# Patient Record
Sex: FEMALE | Race: WHITE | NOT HISPANIC OR LATINO | URBAN - METROPOLITAN AREA
[De-identification: names, ages, dates, MRNs, and addresses within clinical notes are randomized per-mention and may not be internally consistent; named-entity substitution may affect disease eponyms.]

---

## 2020-01-16 ENCOUNTER — EMERGENCY (EMERGENCY)
Facility: HOSPITAL | Age: 85
LOS: 1 days | Discharge: HOME | End: 2020-01-16
Attending: STUDENT IN AN ORGANIZED HEALTH CARE EDUCATION/TRAINING PROGRAM
Payer: MEDICARE

## 2020-01-16 VITALS
HEART RATE: 79 BPM | RESPIRATION RATE: 18 BRPM | DIASTOLIC BLOOD PRESSURE: 60 MMHG | SYSTOLIC BLOOD PRESSURE: 131 MMHG | TEMPERATURE: 98 F | OXYGEN SATURATION: 97 %

## 2020-01-16 VITALS
TEMPERATURE: 97 F | OXYGEN SATURATION: 100 % | WEIGHT: 130.07 LBS | HEIGHT: 65 IN | SYSTOLIC BLOOD PRESSURE: 150 MMHG | DIASTOLIC BLOOD PRESSURE: 75 MMHG | RESPIRATION RATE: 18 BRPM | HEART RATE: 82 BPM

## 2020-01-16 PROCEDURE — 72125 CT NECK SPINE W/O DYE: CPT

## 2020-01-16 PROCEDURE — 70450 CT HEAD/BRAIN W/O DYE: CPT

## 2020-01-16 PROCEDURE — 76377 3D RENDER W/INTRP POSTPROCES: CPT

## 2020-01-16 PROCEDURE — 90715 TDAP VACCINE 7 YRS/> IM: CPT

## 2020-01-16 PROCEDURE — 70486 CT MAXILLOFACIAL W/O DYE: CPT | Mod: 26

## 2020-01-16 PROCEDURE — 70450 CT HEAD/BRAIN W/O DYE: CPT | Mod: 26

## 2020-01-16 PROCEDURE — 12051 INTMD RPR FACE/MM 2.5 CM/<: CPT

## 2020-01-16 PROCEDURE — 72125 CT NECK SPINE W/O DYE: CPT | Mod: 26

## 2020-01-16 PROCEDURE — 99284 EMERGENCY DEPT VISIT MOD MDM: CPT | Mod: 25,GC

## 2020-01-16 PROCEDURE — 99284 EMERGENCY DEPT VISIT MOD MDM: CPT | Mod: 25

## 2020-01-16 PROCEDURE — 76377 3D RENDER W/INTRP POSTPROCES: CPT | Mod: 26

## 2020-01-16 PROCEDURE — 90471 IMMUNIZATION ADMIN: CPT

## 2020-01-16 PROCEDURE — 70486 CT MAXILLOFACIAL W/O DYE: CPT

## 2020-01-16 RX ORDER — TETANUS TOXOID, REDUCED DIPHTHERIA TOXOID AND ACELLULAR PERTUSSIS VACCINE, ADSORBED 5; 2.5; 8; 8; 2.5 [IU]/.5ML; [IU]/.5ML; UG/.5ML; UG/.5ML; UG/.5ML
0.5 SUSPENSION INTRAMUSCULAR ONCE
Refills: 0 | Status: COMPLETED | OUTPATIENT
Start: 2020-01-16 | End: 2020-01-16

## 2020-01-16 RX ADMIN — TETANUS TOXOID, REDUCED DIPHTHERIA TOXOID AND ACELLULAR PERTUSSIS VACCINE, ADSORBED 0.5 MILLILITER(S): 5; 2.5; 8; 8; 2.5 SUSPENSION INTRAMUSCULAR at 20:26

## 2020-01-16 NOTE — ED PROCEDURE NOTE - PROCEDURE ADDITIONAL DETAILS
2 sutures on the lateral L aspect of the nose w/ 6-0 prolene  and 1 suture on the L upper lip w/ 6-0 prolene

## 2020-01-16 NOTE — ED PROVIDER NOTE - PHYSICAL EXAMINATION
Gen: well developed and well nourished, NAD  Head: necrotic scab noted at top of the head s/p Mohs procedure pt and her phsyician  is aware  Face: punctuate laceration of nose, no nasal  hematoma, abrasion of forehead  Eyes: PERRL, EOMI  ENT: airway patent, mmm, oral cavity and pharynx normal. No inflammation, swelling, exudate, or lesions. uvula midline  CV: RRR, +S1/S2, no M/R/G  Resp: CTAB, symmetric breath sounds, no W/R/R  GI: abdomen soft non-distended, NTTP  Back: no spinal ttp c/t/l, no rib tenderness   Extremities - symmetric pulses, no edema, 5/5 strength, sensation intact, small abrasion of the LT hand  Neuro: A&Ox3, following commands, speech clear, moving all four extremities spontaneously  Psych: appropriate mood, normal insight

## 2020-01-16 NOTE — ED PROVIDER NOTE - PROGRESS NOTE DETAILS
Resident Merlin: abrasions and laceration is thoroughly cleaned with normal saline, clean dressing applied. How Severe Are Your Spot(S)?: mild What Is The Reason For Today's Visit?: Full Body Skin Examination What Is The Reason For Today's Visit? (Being Monitored For X): concerning skin lesions on an annual basis

## 2020-01-16 NOTE — ED PROVIDER NOTE - PATIENT PORTAL LINK FT
You can access the FollowMyHealth Patient Portal offered by [facility] by registering at the following website: [email or web address]. By joining [facility]’s FollowMyHealth portal, you will also be able to view your health information using other applications (apps) compatible with our system.

## 2020-01-16 NOTE — ED ADULT NURSE NOTE - OBJECTIVE STATEMENT
89 yo female brought in by ambulance for mechanical fall outside of her apartment building. 88 yo female brought in by ambulance for mechanical fall outside of her apartment building. States she tripped, lost her footing and fell forward and hit her head. Denies LOC, no blood thinners, denies CP, dizziness, SOB prior to or following fall. A man who lives in her building witnessed it and called 911, helped her up. On exam pt. has abrasion to the nasal bridge and forehead, denies pain elsewhere on her body. No other signs of injury or trauma. UK last tetanus but not sure if she would like to update it, will revisit later. Abd soft, non tender, neuro exam in tact. Grandson at the bedside, plan of care explained, bed locked and in lowest position, call bell provided.

## 2020-01-16 NOTE — ED PROVIDER NOTE - ATTENDING CONTRIBUTION TO CARE
89 F w/ PMH breast cancer and scc, HTN, on aspirin p/w trip and fall from standing hitting face falling forward at 430 pm. Pt denies any cp, no sob, no nausea no vomiting, no fevers, no chills.     I have reviewed the triage vital signs. Const: Well-nourished, Well-developed, Eyes: no conjunctival injection and no scleral icterus ENMT: abrasion on the nose and scalp,  approx 0.5 cm in size, Moist mucus membranes, SPINE: no c/t/l spine tenderness CVS: +S1/S2, radial/DP pulse 2+ bilaterally RESP: Unlabored respiratory effort, Clear to auscultation bilaterally GI: Nontender/Nondistended soft abdomen, no CVA tenderness MSK: Extremities w/o deformity or ttp, Psych: Awake, Alert, & Orientedx3;  Appropriate mood and affect, cooperative    Concern for intracranial hemorrhage, nasal bone fx, will obtain head ct/max face and c spine  will repair bleeding wounds

## 2020-01-16 NOTE — ED ADULT TRIAGE NOTE - CHIEF COMPLAINT QUOTE
fall from standing.  Head and facial trauma.  Patient is A/oX4, no bony deformity palpated upon arrival.  Pt on unknown anticoagulant

## 2020-01-16 NOTE — ED ADULT NURSE NOTE - INTERVENTIONS DEFINITIONS
Call bell, personal items and telephone within reach/Instruct patient to call for assistance/Non-slip footwear when patient is off stretcher/Monitor gait and stability/Stretcher in lowest position, wheels locked, appropriate side rails in place/Provide visual cue, wrist band, yellow gown, etc.

## 2020-01-16 NOTE — ED PROVIDER NOTE - NSFOLLOWUPINSTRUCTIONS_ED_ALL_ED_FT
1. TAKE ALL MEDICATIONS AS DIRECTED.    2. FOR PAIN OR FEVER YOU CAN TAKE IBUPROFEN (MOTRIN, ADVIL) OR ACETAMINOPHEN (TYLENOL) AS NEEDED, AS DIRECTED ON PACKAGING.  3. FOLLOW UP WITH YOUR PRIMARY DOCTOR WITHIN 5 DAYS AS DIRECTED.  4. IF YOU HAD LABS OR IMAGING DONE, YOU WERE GIVEN COPIES OF ALL LABS AND/OR IMAGING RESULTS FROM YOUR ER VISIT--PLEASE TAKE THEM WITH YOU TO YOUR FOLLOW UP APPOINTMENTS.  5. IF NEEDED, CALL PATIENT ACCESS SERVICES AT [phone] TO FIND A PRIMARY CARE PHYSICIAN.  OR CALL [phone] TO MAKE AN APPOINTMENT WITH THE CLINIC.  6. RETURN TO THE ER FOR ANY WORSENING SYMPTOMS OR CONCERNS. 1. TAKE ALL MEDICATIONS AS DIRECTED.    2. FOR PAIN OR FEVER YOU CAN TAKE IBUPROFEN (MOTRIN, ADVIL) OR ACETAMINOPHEN (TYLENOL) AS NEEDED, AS DIRECTED ON PACKAGING.  3. FOLLOW UP WITH YOUR PRIMARY DOCTOR WITHIN 5 DAYS AS DIRECTED.  4. IF YOU HAD LABS OR IMAGING DONE, YOU WERE GIVEN COPIES OF ALL LABS AND/OR IMAGING RESULTS FROM YOUR ER VISIT--PLEASE TAKE THEM WITH YOU TO YOUR FOLLOW UP APPOINTMENTS.  5. IF NEEDED, CALL PATIENT ACCESS SERVICES AT [phone] TO FIND A PRIMARY CARE PHYSICIAN.  OR CALL [phone] TO MAKE AN APPOINTMENT WITH THE CLINIC.  6. RETURN TO THE ER FOR ANY WORSENING SYMPTOMS OR CONCERNS.  7. please return in 7 days for suture removal.    A laceration is a cut that goes through all of the layers of the skin and into the tissue that is right under the skin. Some lacerations heal on their own. Others need to be closed with skin adhesive strips, skin glue, stitches (sutures), or staples. Proper laceration care minimizes the risk of infection and helps the laceration to heal better.  If non-absorbable stitches or staples have been placed, they must be taken out within the time frame instructed by your healthcare provider.    SEEK IMMEDIATE MEDICAL CARE IF YOU HAVE ANY OF THE FOLLOWING SYMPTOMS: swelling around the wound, worsening pain, drainage from the wound, red streaking going away from your wound, inability to move finger or toe near the laceration, or discoloration of skin near the laceration.

## 2020-01-16 NOTE — ED PROVIDER NOTE - CLINICAL SUMMARY MEDICAL DECISION MAKING FREE TEXT BOX
89 yo F with pmhx of  breast cancer and scc, HTN, on aspirin presents with facial laceration s/p trip and fall onto her face and hands1 hr ago. Imaging to eval for hematoma, facial fx. Clean the wounds, no reparation needed. TDAP. Will reassess. 89 yo F with pmhx of  breast cancer and scc, HTN, on aspirin presents with facial laceration s/p trip and fall onto her face and hands1 hr ago. Imaging to eval for hematoma, facial fx. Clean the wounds, no reparation needed. TDAP. Declined pain mgmt. Will reassess.

## 2020-01-16 NOTE — ED PROVIDER NOTE - OBJECTIVE STATEMENT
88 yo F with pmhx of  breast cancer and scc, HTN, on aspirin presents with facial laceration s/p trip and fall onto her face and hands1 hr ago. Tetanus status unknown. Denies fever, chills, nausea, vomiting, diarrhea, chest pain, shortness of breath, palpitations, cough, leg swelling, dysuria. Denies LOC. No pain anywhere.

## 2020-02-08 ENCOUNTER — INPATIENT (INPATIENT)
Facility: HOSPITAL | Age: 85
LOS: 4 days | Discharge: HOME | DRG: 194 | End: 2020-02-13
Attending: STUDENT IN AN ORGANIZED HEALTH CARE EDUCATION/TRAINING PROGRAM | Admitting: STUDENT IN AN ORGANIZED HEALTH CARE EDUCATION/TRAINING PROGRAM
Payer: MEDICARE

## 2020-02-08 VITALS
SYSTOLIC BLOOD PRESSURE: 154 MMHG | RESPIRATION RATE: 18 BRPM | WEIGHT: 119.93 LBS | TEMPERATURE: 98 F | HEIGHT: 60 IN | HEART RATE: 67 BPM | OXYGEN SATURATION: 98 % | DIASTOLIC BLOOD PRESSURE: 98 MMHG

## 2020-02-08 DIAGNOSIS — E78.5 HYPERLIPIDEMIA, UNSPECIFIED: ICD-10-CM

## 2020-02-08 DIAGNOSIS — J10.1 INFLUENZA DUE TO OTHER IDENTIFIED INFLUENZA VIRUS WITH OTHER RESPIRATORY MANIFESTATIONS: ICD-10-CM

## 2020-02-08 DIAGNOSIS — R19.7 DIARRHEA, UNSPECIFIED: ICD-10-CM

## 2020-02-08 DIAGNOSIS — R53.81 OTHER MALAISE: ICD-10-CM

## 2020-02-08 DIAGNOSIS — I10 ESSENTIAL (PRIMARY) HYPERTENSION: ICD-10-CM

## 2020-02-08 DIAGNOSIS — N17.9 ACUTE KIDNEY FAILURE, UNSPECIFIED: ICD-10-CM

## 2020-02-08 DIAGNOSIS — R53.1 WEAKNESS: ICD-10-CM

## 2020-02-08 DIAGNOSIS — Z29.9 ENCOUNTER FOR PROPHYLACTIC MEASURES, UNSPECIFIED: ICD-10-CM

## 2020-02-08 LAB
ALBUMIN SERPL ELPH-MCNC: 3.3 G/DL — SIGNIFICANT CHANGE UP (ref 3.3–5)
ALP SERPL-CCNC: 90 U/L — SIGNIFICANT CHANGE UP (ref 40–120)
ALT FLD-CCNC: 30 U/L — SIGNIFICANT CHANGE UP (ref 10–45)
ANION GAP SERPL CALC-SCNC: 15 MMOL/L — SIGNIFICANT CHANGE UP (ref 5–17)
APPEARANCE UR: CLEAR — SIGNIFICANT CHANGE UP
AST SERPL-CCNC: 36 U/L — SIGNIFICANT CHANGE UP (ref 10–40)
BACTERIA # UR AUTO: NEGATIVE — SIGNIFICANT CHANGE UP
BASE EXCESS BLDV CALC-SCNC: 4.4 MMOL/L — HIGH (ref -2–2)
BILIRUB SERPL-MCNC: 0.4 MG/DL — SIGNIFICANT CHANGE UP (ref 0.2–1.2)
BILIRUB UR-MCNC: NEGATIVE — SIGNIFICANT CHANGE UP
BUN SERPL-MCNC: 46 MG/DL — HIGH (ref 7–23)
CA-I SERPL-SCNC: 1.16 MMOL/L — SIGNIFICANT CHANGE UP (ref 1.12–1.3)
CALCIUM SERPL-MCNC: 9.9 MG/DL — SIGNIFICANT CHANGE UP (ref 8.4–10.5)
CHLORIDE BLDV-SCNC: 106 MMOL/L — SIGNIFICANT CHANGE UP (ref 96–108)
CHLORIDE SERPL-SCNC: 99 MMOL/L — SIGNIFICANT CHANGE UP (ref 96–108)
CO2 BLDV-SCNC: 29 MMOL/L — SIGNIFICANT CHANGE UP (ref 22–30)
CO2 SERPL-SCNC: 25 MMOL/L — SIGNIFICANT CHANGE UP (ref 22–31)
COLOR SPEC: SIGNIFICANT CHANGE UP
CREAT SERPL-MCNC: 1.26 MG/DL — SIGNIFICANT CHANGE UP (ref 0.5–1.3)
DIFF PNL FLD: NEGATIVE — SIGNIFICANT CHANGE UP
EPI CELLS # UR: 2 /HPF — SIGNIFICANT CHANGE UP
FLUAV H1 2009 PAND RNA SPEC QL NAA+PROBE: DETECTED
GAS PNL BLDV: 134 MMOL/L — LOW (ref 135–145)
GAS PNL BLDV: SIGNIFICANT CHANGE UP
GLUCOSE BLDV-MCNC: 97 MG/DL — SIGNIFICANT CHANGE UP (ref 70–99)
GLUCOSE SERPL-MCNC: 83 MG/DL — SIGNIFICANT CHANGE UP (ref 70–99)
GLUCOSE UR QL: NEGATIVE — SIGNIFICANT CHANGE UP
HCO3 BLDV-SCNC: 28 MMOL/L — SIGNIFICANT CHANGE UP (ref 21–29)
HCT VFR BLD CALC: 38.1 % — SIGNIFICANT CHANGE UP (ref 34.5–45)
HCT VFR BLDA CALC: 39 % — SIGNIFICANT CHANGE UP (ref 39–50)
HGB BLD CALC-MCNC: 12.6 G/DL — SIGNIFICANT CHANGE UP (ref 11.5–15.5)
HGB BLD-MCNC: 11.9 G/DL — SIGNIFICANT CHANGE UP (ref 11.5–15.5)
HYALINE CASTS # UR AUTO: 2 /LPF — SIGNIFICANT CHANGE UP (ref 0–2)
KETONES UR-MCNC: NEGATIVE — SIGNIFICANT CHANGE UP
LACTATE BLDV-MCNC: 1.6 MMOL/L — SIGNIFICANT CHANGE UP (ref 0.7–2)
LEUKOCYTE ESTERASE UR-ACNC: NEGATIVE — SIGNIFICANT CHANGE UP
MCHC RBC-ENTMCNC: 29.5 PG — SIGNIFICANT CHANGE UP (ref 27–34)
MCHC RBC-ENTMCNC: 31.2 GM/DL — LOW (ref 32–36)
MCV RBC AUTO: 94.5 FL — SIGNIFICANT CHANGE UP (ref 80–100)
NITRITE UR-MCNC: NEGATIVE — SIGNIFICANT CHANGE UP
NRBC # BLD: 0 /100 WBCS — SIGNIFICANT CHANGE UP (ref 0–0)
PCO2 BLDV: 37 MMHG — SIGNIFICANT CHANGE UP (ref 35–50)
PH BLDV: 7.48 — HIGH (ref 7.35–7.45)
PH UR: 5.5 — SIGNIFICANT CHANGE UP (ref 5–8)
PLATELET # BLD AUTO: 207 K/UL — SIGNIFICANT CHANGE UP (ref 150–400)
PO2 BLDV: 50 MMHG — HIGH (ref 25–45)
POTASSIUM BLDV-SCNC: 4 MMOL/L — SIGNIFICANT CHANGE UP (ref 3.5–5.3)
POTASSIUM SERPL-MCNC: 4.5 MMOL/L — SIGNIFICANT CHANGE UP (ref 3.5–5.3)
POTASSIUM SERPL-SCNC: 4.5 MMOL/L — SIGNIFICANT CHANGE UP (ref 3.5–5.3)
PROT SERPL-MCNC: 6.5 G/DL — SIGNIFICANT CHANGE UP (ref 6–8.3)
PROT UR-MCNC: ABNORMAL
RAPID RVP RESULT: DETECTED
RBC # BLD: 4.03 M/UL — SIGNIFICANT CHANGE UP (ref 3.8–5.2)
RBC # FLD: 13.7 % — SIGNIFICANT CHANGE UP (ref 10.3–14.5)
RBC CASTS # UR COMP ASSIST: 5 /HPF — HIGH (ref 0–4)
SAO2 % BLDV: 86 % — SIGNIFICANT CHANGE UP (ref 67–88)
SODIUM SERPL-SCNC: 139 MMOL/L — SIGNIFICANT CHANGE UP (ref 135–145)
SP GR SPEC: 1.02 — SIGNIFICANT CHANGE UP (ref 1.01–1.02)
UROBILINOGEN FLD QL: NEGATIVE — SIGNIFICANT CHANGE UP
WBC # BLD: 7.17 K/UL — SIGNIFICANT CHANGE UP (ref 3.8–10.5)
WBC # FLD AUTO: 7.17 K/UL — SIGNIFICANT CHANGE UP (ref 3.8–10.5)
WBC UR QL: 3 /HPF — SIGNIFICANT CHANGE UP (ref 0–5)

## 2020-02-08 PROCEDURE — 99223 1ST HOSP IP/OBS HIGH 75: CPT

## 2020-02-08 PROCEDURE — 71045 X-RAY EXAM CHEST 1 VIEW: CPT | Mod: 26

## 2020-02-08 PROCEDURE — 99285 EMERGENCY DEPT VISIT HI MDM: CPT | Mod: GC

## 2020-02-08 PROCEDURE — 93010 ELECTROCARDIOGRAM REPORT: CPT | Mod: GC

## 2020-02-08 RX ORDER — SODIUM CHLORIDE 9 MG/ML
1000 INJECTION INTRAMUSCULAR; INTRAVENOUS; SUBCUTANEOUS ONCE
Refills: 0 | Status: COMPLETED | OUTPATIENT
Start: 2020-02-08 | End: 2020-02-08

## 2020-02-08 RX ORDER — LATANOPROST 0.05 MG/ML
1 SOLUTION/ DROPS OPHTHALMIC; TOPICAL AT BEDTIME
Refills: 0 | Status: DISCONTINUED | OUTPATIENT
Start: 2020-02-08 | End: 2020-02-13

## 2020-02-08 RX ORDER — BIMATOPROST 0.3 MG/ML
1 SOLUTION/ DROPS OPHTHALMIC
Qty: 0 | Refills: 0 | DISCHARGE

## 2020-02-08 RX ORDER — ASPIRIN/CALCIUM CARB/MAGNESIUM 324 MG
81 TABLET ORAL DAILY
Refills: 0 | Status: DISCONTINUED | OUTPATIENT
Start: 2020-02-08 | End: 2020-02-13

## 2020-02-08 RX ORDER — TIMOLOL 0.5 %
1 DROPS OPHTHALMIC (EYE)
Refills: 0 | Status: DISCONTINUED | OUTPATIENT
Start: 2020-02-08 | End: 2020-02-13

## 2020-02-08 RX ORDER — IPRATROPIUM/ALBUTEROL SULFATE 18-103MCG
3 AEROSOL WITH ADAPTER (GRAM) INHALATION ONCE
Refills: 0 | Status: COMPLETED | OUTPATIENT
Start: 2020-02-08 | End: 2020-02-08

## 2020-02-08 RX ORDER — TIMOLOL 0.5 %
1 DROPS OPHTHALMIC (EYE)
Qty: 0 | Refills: 0 | DISCHARGE

## 2020-02-08 RX ORDER — ROSUVASTATIN CALCIUM 5 MG/1
1 TABLET ORAL
Qty: 0 | Refills: 0 | DISCHARGE

## 2020-02-08 RX ORDER — ASCORBIC ACID 60 MG
1 TABLET,CHEWABLE ORAL
Qty: 0 | Refills: 0 | DISCHARGE

## 2020-02-08 RX ORDER — ASPIRIN/CALCIUM CARB/MAGNESIUM 324 MG
1 TABLET ORAL
Qty: 0 | Refills: 0 | DISCHARGE

## 2020-02-08 RX ORDER — ATORVASTATIN CALCIUM 80 MG/1
40 TABLET, FILM COATED ORAL AT BEDTIME
Refills: 0 | Status: DISCONTINUED | OUTPATIENT
Start: 2020-02-08 | End: 2020-02-13

## 2020-02-08 RX ORDER — CHOLECALCIFEROL (VITAMIN D3) 125 MCG
1 CAPSULE ORAL
Qty: 0 | Refills: 0 | DISCHARGE

## 2020-02-08 RX ORDER — NITROFURANTOIN MACROCRYSTAL 50 MG
50 CAPSULE ORAL
Refills: 0 | Status: DISCONTINUED | OUTPATIENT
Start: 2020-02-08 | End: 2020-02-08

## 2020-02-08 RX ORDER — HEPARIN SODIUM 5000 [USP'U]/ML
5000 INJECTION INTRAVENOUS; SUBCUTANEOUS THREE TIMES A DAY
Refills: 0 | Status: DISCONTINUED | OUTPATIENT
Start: 2020-02-08 | End: 2020-02-13

## 2020-02-08 RX ADMIN — Medication 3 MILLILITER(S): at 17:45

## 2020-02-08 RX ADMIN — SODIUM CHLORIDE 1000 MILLILITER(S): 9 INJECTION INTRAMUSCULAR; INTRAVENOUS; SUBCUTANEOUS at 17:45

## 2020-02-08 NOTE — ED PROVIDER NOTE - CARE PLAN
Principal Discharge DX:	Weakness Principal Discharge DX:	Weakness  Goal:	**ATTENDING ADDENDUM (Dr. Kirill Stinson): Goals of care include resolution of emergent/urgent symptoms and concerns, and restoration to baseline level of homeostasis.  Secondary Diagnosis:	Influenza  Secondary Diagnosis:	Malaise and fatigue  Secondary Diagnosis:	Hypoxia

## 2020-02-08 NOTE — ED PROVIDER NOTE - GASTROINTESTINAL, MLM
Abdomen soft, non-tender **ATTENDING ADDENDUM (Dr. Kirill Stinson): non-distended. NO guarding, rebound, or rigidity. NO pulsatile or non-pulsatile masses. NO hernias. NO obvious hepatosplenomegaly.

## 2020-02-08 NOTE — ED PROVIDER NOTE - NS ED ROS FT
General: +fatigue; denies dizziness  ENMT: Denies rhinorrhea  Respiratory: +cough; denies SOB  Cardiovascular: Denies palpitations, CP  Gastrointestinal: +diarrhea; denies abd pain, N/V  : Denies dysuria, increased freq  Musculoskeletal: Denies edema, joint pain  Endocrine: Denies increased thirst, increased frequency  Allergic/Immunologic: Denies rashes or hives  Neuro: +weakness; denies numbness General: +fatigue; denies dizziness  ENMT: Denies rhinorrhea  Respiratory: +cough; denies SOB  Cardiovascular: Denies palpitations, CP  Gastrointestinal: +diarrhea; denies abd pain, N/V  : Denies dysuria, increased freq  Musculoskeletal: Denies edema, joint pain  Endocrine: Denies increased thirst, increased frequency  Allergic/Immunologic: Denies rashes or hives  Neuro: +weakness; denies numbness  **ATTENDING ADDENDUM (Dr. Kirill Stinson): During my interview with the patient, I have personally obtained and/or have directly verified the elements in the past medical/surgical history and other histories as noted earlier in the EMR,  in conjunction with the other members (resident/PA/NP) of the patient care team. I have also personally obtained and/or have directly verified/reviewed the review of systems as documented below, in conjunction with the other members (resident/PA/NP) of the patient care team.

## 2020-02-08 NOTE — ED PROVIDER NOTE - ATTENDING CONTRIBUTION TO CARE
**ATTENDING ADDENDUM (Dr. Kirill Stinson): I attest that I have directly examined this patient and reviewed and formulated the diagnostic and therapeutic management plan in collaboration with the IM resident on rotation in the ED. Please see MDM note and remainder of EMR for findings from CC, HPI, ROS, and PE. ([place])

## 2020-02-08 NOTE — ED PROVIDER NOTE - FAMILY DETAILS FREE TEXT FOR MDM ADDL HISTORY OBTAINED FROM QUESTION
**ATTENDING ADDENDUM (Dr. Kirill Stinson): family member(s) present during patient's ED visit; corroborated CC, HPI and review of systems as provided by patient.

## 2020-02-08 NOTE — H&P ADULT - NSHPPHYSICALEXAM_GEN_ALL_CORE
PHYSICAL EXAM:    Vital Signs Last 24 Hrs  T(C): 36.8 (08 Feb 2020 11:43), Max: 36.8 (08 Feb 2020 11:43)  T(F): 98.3 (08 Feb 2020 11:43), Max: 98.3 (08 Feb 2020 11:43)  HR: 62 (08 Feb 2020 19:30) (61 - 67)  BP: 134/62 (08 Feb 2020 19:30) (134/62 - 155/65)  BP(mean): --  RR: 17 (08 Feb 2020 19:30) (17 - 18)  SpO2: 96% (08 Feb 2020 19:30) (96% - 98%)    GENERAL: NAD, well-groomed, well-developed  HEAD:  Atraumatic, Normocephalic  EYES: EOMI, PERRLA, conjunctiva and sclera clear  ENMT: No tonsillar erythema, exudates, or enlargement; Moist mucous membranes, Good dentition, No lesions  NECK: Supple, No JVD, Normal thyroid  CHEST/LUNG: Clear to percussion bilaterally; No rales, rhonchi, wheezing, or rubs  HEART: Regular rate and rhythm; No murmurs, rubs, or gallops  ABDOMEN: Soft, Nontender, Nondistended; Bowel sounds present  EXTREMITIES:  2+ Peripheral Pulses, No clubbing, cyanosis, or edema  LYMPH: No lymphadenopathy noted  SKIN: No rashes or lesions  NERVOUS SYSTEM:  Alert & Oriented X3, Good concentration; Motor Strength 5/5 B/L upper and lower extremities; DTRs 2+ intact and symmetric PHYSICAL EXAM:    Vital Signs Last 24 Hrs  T(C): 36.8 (08 Feb 2020 11:43), Max: 36.8 (08 Feb 2020 11:43)  T(F): 98.3 (08 Feb 2020 11:43), Max: 98.3 (08 Feb 2020 11:43)  HR: 62 (08 Feb 2020 19:30) (61 - 67)  BP: 134/62 (08 Feb 2020 19:30) (134/62 - 155/65)  BP(mean): --  RR: 17 (08 Feb 2020 19:30) (17 - 18)  SpO2: 96% (08 Feb 2020 19:30) (96% - 98%)    GENERAL: NAD, elderly, frail  HEAD:  Atraumatic, Normocephalic  EYES: EOMI, PERRLA, conjunctiva and sclera clear  ENMT: No tonsillar erythema, exudates, or enlargement; dry mucous membranes, poor dentition, No lesions  NECK: Supple, No JVD, Normal thyroid  CHEST/LUNG: Clear to percussion bilaterally; No rales, rhonchi, wheezing, or rubs no resp distress or accessory muscle usage.   HEART: Regular rate and rhythm; No murmurs, rubs, or gallops, trace edema b/l LE  ABDOMEN: Soft, Nontender, Nondistended; Bowel sounds present, no rebound/guarding  EXTREMITIES:  2+ Peripheral Pulses, No clubbing, cyanosis  LYMPH: No lymphadenopathy noted, no lymphangitis  SKIN: No rashes or lesions, no petechiae  NERVOUS SYSTEM:  Alert & Oriented X3, Good concentration; Motor Strength 5/5 B/L upper and lower extremities. no facial droop, no dysarthria, tongue midline.

## 2020-02-08 NOTE — H&P ADULT - PROBLEM SELECTOR PLAN 4
-given antecedent abx exposure, will check cdiff/stool cx/op/pcr  -no abd pain, hold off on abd imaging at this time.   -afebrile, no leukocytosis

## 2020-02-08 NOTE — ED PROVIDER NOTE - ENMT, MLM
Airway patent, Nasal mucosa clear. Throat has no vesicles, no oropharyngeal exudates and uvula is midline. **ATTENDING ADDENDUM (Dr. Kirill Stinson): POSITIVE dry mucous membranes, lips, and tongue. POSITIVE chronic lesion on top of scalp secondary to remote history of skin graft (related to "skin cancer" not otherwise clarified/specified). NO hemorrhage. NO ecchymoses. AIRWAY CLEAR. NO pooling of secretions, POSITIVE gag reflex, NO debris, ABLE TO SELF-PROTECT AIRWAY. POSITIVE full range of motion of the mandible. NO temporomandibular joint tenderness with range of motion or palpation. NO dental trauma. NO malocclusion. NO epistaxis. NO nasal or facial bone tenderness or deformity.

## 2020-02-08 NOTE — ED PROVIDER NOTE - SHIFT CHANGE DETAILS
**ATTENDING ADDENDUM - HANDOFF (from Dr. Kirill Stinson): (1) Follow up RVP and remaining diagnostics (2) serial reevaluation / observation (3) disposition pending, likely admission for social concerns (falls, placement to subacute rehabilitation? v. home physical therapy/occupational therapy), viral upper respiratory tract infection and dehydration.

## 2020-02-08 NOTE — H&P ADULT - NSHPLABSRESULTS_GEN_ALL_CORE
Labs personally reviewed  no leukocytosis rest cbc unrevealing, elevated scr for age ?tolu vs. ckd CrCl ~25, rest cmp unrevealing, Lactate wnl, UA neg, RVP +Flu AH3.   Imaging personally reviewed CXR clear   EKG personally reviewed

## 2020-02-08 NOTE — ED PROVIDER NOTE - PROGRESS NOTE DETAILS
**ATTENDING ADDENDUM (Dr. Kirill Stinson): patient serially evaluated throughout ED course. NO acute deterioration up to this time in the ED. **ATTENDING ADDENDUM (Dr. Kirill Stinson): patient serially evaluated throughout ED course. NO acute deterioration up to this time in the ED. Personally reassessed. ED diagnostics up to this time acknowledged, reviewed and noted. Likely viral syndrome (favored over serious bacterial infection or sepsis/severe sepsis e.g. community-acquired pneumonia with atypical pathogens > typical pathogens (considered Legionella spp. given diarrhea), urinary tract infection, pyelonephritis, or bacteremia). Possible influenza, hMPV, RSV or equivalent. Likely to require admission re: gradual subacute deterioration over the past two weeks leading to falls (thankfully, NO obvious serious sequela from the previous two falls). NO evidence of intracerebral hemorrhage, cord/spine trauma, intra-thoracic hemorrhage (hemothorax), pneumothorax, intra-abdominal hemorrhage (hemoperitoneum), pelvic or other extremity injury s/p falls. NO severe electrolyte-metabolic-endocrine derangements noted. Evening ED team Will continue to observe and monitor closely until admission made. **ATTENDING ADDENDUM (Dr. Kirill Stinson): RVP result noted: POSITIVE influenza. (Handoff already made at this time).

## 2020-02-08 NOTE — ED PROVIDER NOTE - PLAN OF CARE
**ATTENDING ADDENDUM (Dr. Kirill Stinson): Goals of care include resolution of emergent/urgent symptoms and concerns, and restoration to baseline level of homeostasis.

## 2020-02-08 NOTE — ED PROVIDER NOTE - CLINICAL SUMMARY MEDICAL DECISION MAKING FREE TEXT BOX
88 yo F p/w acute on chronic weakness, acutely worse likely 2/2 infection. Will get labs, CXR, UA, UCx, BCx to evaluate for infection and likely admit due to inability to safely return home. 88 yo F p/w acute on chronic weakness, acutely worse likely 2/2 infection. Will get labs, CXR, UA, UCx, BCx to evaluate for infection and likely admit due to inability to safely return home.  **ATTENDING MEDICAL DECISION MAKING/SYNTHESIS (Dr. Kirill Stinson): I have reviewed the Chief Complaint, the HPI, the ROS, and have directly performed and confirmed the findings on the Physical Examination. I have reviewed the medical decision making with all providers, as applicable. The PROBLEM REPRESENTATION at this time is: 89-year-old woman with history of Hypertension. hyperlipidemia, breast cancer, and skin cancer (s/p skin grafting on top of head in remote past) now presenting with progressively worsening generalized weakness, malaise, fatigue, and cough with associated hypoxia and rhonchi over the past two weeks. POSITIVE two fall reported over the past two weeks, without features suggesting syncope or near-syncope, but with features of mechanical etiologies. The MOST LIKELY DIAGNOSIS, and the LIST OF DIFFERENTIAL DIAGNOSES, includes (but is not limited to) the following: CAUSE FOR GENERAL MALAISE: viral syndrome, dehydration, electrolyte-metabolic-endocrine derangements, serious bacterial infection or sepsis/severe sepsis e.g. pneumonia, urinary tract infection, pyelonephritis or equivalent; CAUSE FOR HYPOXIA: serious bacterial infection or sepsis/severe sepsis e.g. viral syndrome favored over serious bacterial infection or sepsis/severe sepsis e.g. pneumonia (atypical > typical community-acquired pneumonia v. equivalent), rib fracture or chest wall trauma, pulmonary embolism/deep venous thrombosis (considered, unlikely); congestive heart failure/ acute coronary syndrome (considered, unlikely); SEQUELA OF FALL: cord/spine injury (unlikely given presentation and clinical findings), intracerebral hemorrhage (NO evidence), intra-thoracic hemorrhage (hemothorax), rib fracture(s) or flail chest, intra-abdominal hemorrhage (hemoperitoneum), pelvis or other extremity injury, pneumothorax, hemoperitoneum (NO evidence of any of the latter conditions), concussion (possible), contusions (obvious), sprain/strain (possible), facial bone fractures (possible), CAUSE FOR FALL OTHER THAN MECHANICAL (unlikely): arrhythmia, dysequilibrium, cerebrovascular accident, transient ischemic attack, acute coronary syndrome, syncope, near-syncope, vasovagal syndrome, dehydration, electrolyte-metabolic-endocrine derangements, anemia, deconditioning. The likelihood of each of these diagnoses has been appropriately considered in the context of this patient's presentation and my evaluation. PLAN: as described in EMR, including diagnostics, therapeutics and consultation as clinically warranted. I will continue to reevaluate the patient, including the results of all testing, and monitor response to therapy throughout the patient's course in the ED.

## 2020-02-08 NOTE — ED PROVIDER NOTE - OBJECTIVE STATEMENT
88 yo F with HTN, HLD, h/o breast cancer presenting with cough. Pt and family at bedside endorse a few weeks of worsening weakness and fatigue with 2 falls in the past 3 weeks due to weakness. One week ago, pt was feeling especially weak and fell again. That same day, she developed a cuogh and went to her pcp who prescribed azithromycin and prednisone which improved her symptoms for 2 days. After the first 2 days, however, her symptoms returned and she began to feel weak and with heavy chest congestion. Yesterday, she also developed diarrhea, at least 5 watery stools with some blood around it though pt has intermittent rectal bleeding for years 2/2 hemorrhoids. Per family, she is so weak that they cannot take care of her without help anymore as she cannot ambulate without help. 88 yo F with HTN, HLD, h/o breast cancer presenting with cough. Pt and family at bedside endorse a few weeks of worsening weakness and fatigue with 2 falls in the past 3 weeks due to weakness. One week ago, pt was feeling especially weak and fell again. That same day, she developed a cough and went to her pcp who prescribed azithromycin and prednisone which improved her symptoms for 2 days. After the first 2 days, however, her symptoms returned and she began to feel weak and with heavy chest congestion. Yesterday, she also developed diarrhea, at least 5 watery stools with some blood around it though pt has intermittent rectal bleeding for years 2/2 hemorrhoids. Per family, she is so weak that they cannot take care of her without help anymore as she cannot ambulate without help.  **ATTENDING ADDENDUM (Dr. Kirill Stinson): I attest that I have directly and personally interviewed and examined this patient and elicited a comparable history of present illness and review of systems as documented, along with my IM resident on rotation in the ED. I attest that I have made significant contributions to the documentation where necessary and as noted in the EMR. VAS 2/10.

## 2020-02-08 NOTE — ED PROVIDER NOTE - CONTEXT
known (describe)/**ATTENDING ADDENDUM (Dr. Kirill Stinson): s/p recent course of antibiotics (azithromycin) and corticosteroids. NO MDI. Regarding falls, reportedly mechanical (NO specific syncope or near-syncope reported; NO chest pain, palpitations, focal or generalized weakness, numbness, tingling, weakness, or paresthesias reported).

## 2020-02-08 NOTE — ED ADULT NURSE NOTE - OBJECTIVE STATEMENT
89y female with hx of htn/hld BIBEMS c/o cough x 1 week. pt is alert and oriented x 4 and speaking coherently. pt states that 89y female with hx of htn/hld BIBEMS c/o cough x 1 week. pt is alert and oriented x 4 and speaking coherently. pt states that x 1 week she has had a non productive cough. pt went to her PCP on monday and was given abx(azithromycin). pt is here today due after cough has not improved, and is short of breath while walking. pt states she also had diarrhea yesterday, family at the bedside was concerned as pt is not at her baseline physically. pt denies fevers, cp, n/v. Pt vss. pending md conley. will reassess.

## 2020-02-08 NOTE — H&P ADULT - PROBLEM SELECTOR PLAN 2
-in setting of flu and dec po suspect prerenal  -temp hold diuretic, arb  -trend bmp  -s/p IVF in ER  -encourage po intake  -renally dose meds  -avoid nephrotoxins

## 2020-02-08 NOTE — ED PROVIDER NOTE - MUSCULOSKELETAL, MLM
Spine appears normal, range of motion is not limited, no muscle or joint tenderness **ATTENDING ADDENDUM (Dr. Kirill Stinson): NO cords, soft-tissue swelling, or calf tenderness in the bilateral lower extremities.

## 2020-02-08 NOTE — ED PROVIDER NOTE - PHYSICAL EXAMINATION
Constitutional: Elderly woman seen lying in bed in NAD  Eyes: PERRL, sclera clear  ENMT: MMM, clear oropharynx  Neck: Supple, no cervical LAD  Respiratory: Diffuse rhonchi throughout lung fields  Cardiovascular: RRR, no m/g/r  Gastrointestinal: +BS, soft, NT/ND  Neurological: AAOx3  Psychiatric: Appropriate affect and mood  Extremities: No LE edema Constitutional: Elderly woman seen lying in bed in NAD  Eyes: PERRL, sclera clear  ENMT: MMM, clear oropharynx  Neck: Supple, no cervical LAD  Respiratory: Diffuse rhonchi throughout lung fields  Cardiovascular: RRR, no m/g/r  Gastrointestinal: +BS, soft, NT/ND  Neurological: AAOx3  Psychiatric: Appropriate affect and mood  Extremities: No LE edema  **ATTENDING ADDENDUM (Dr. Kirill Stinson): I have reviewed and substantially contributed to the elements of the PE as documented above. I have directly performed an examination of this patient in conjunction with the other members (resident/PA/NP) of the patient care team. Of note, and in addition to the above, regarding the patient's musculoskeletal examination, there is NO cervical-thoracic-lumbar-sacral midline bony tenderness or stepoff noted. NO rib or chest wall tenderness with palpation or deep inspiration. NO pleuritic chest wall pain or tenderness. NO cervical spine tenderness with axial loading.

## 2020-02-08 NOTE — ED PROVIDER NOTE - CHIEF COMPLAINT
The patient is a 89y Female complaining of cough. The patient is a 89y Female complaining of cough, malaise, generalized weakness, and hypoxia on presentation.

## 2020-02-08 NOTE — ED PROVIDER NOTE - CHPI ED SYMPTOMS POS
WEAKNESS/FATIGUE/**ATTENDING ADDENDUM (Dr. Kirill Stinson): falls (twice) over the past two weeks (s/p ED visit for first fall; initial diagnostics e.g. CT reportedly "negative"). POSITIVE cough, chest congestion, and hypoxia on RA FiO2 today (89-94%).

## 2020-02-08 NOTE — H&P ADULT - ATTENDING COMMENTS
Care to be assumed by [facility] Hospitalist at 8 am.  This patient was assigned to me by the hospitalist in charge; my involvement in this case has consisted of the initial history, physical, chart review, and management plan.  This patient was previously unknown to me.

## 2020-02-08 NOTE — H&P ADULT - ASSESSMENT
89 F PMH Breast ca, HTN, HLD, recurrent UTI on daily macrobid ppx, p/w recurrent falls, weakness, episodic diarrhea, f/w Flu AH3.

## 2020-02-08 NOTE — ED ADULT NURSE REASSESSMENT NOTE - NS ED NURSE REASSESS COMMENT FT1
Patient aware they are being admitted to the hospital. Will be notified when bed is available. Patient/family has no further questions at this time. Patient aware they are being admitted to the hospital. Will be notified when bed is available. Patient/family has no further questions at this time. Pending RVP at this time.

## 2020-02-08 NOTE — ED PROVIDER NOTE - LAB INTERPRETATION
**ATTENDING ADDENDUM (Dr. Kirill Stinson): Labs reviewed. Pertinent findings include: NO leukocytosis. POSITIVE slightly elevated creatinine ?dehydration. NO evidence of severe electrolyte-metabolic-endocrine derangements. **ATTENDING ADDENDUM (Dr. Kirill Stinson): Labs reviewed. Pertinent findings include: NO leukocytosis. POSITIVE slightly elevated creatinine ?dehydration. NO evidence of severe electrolyte-metabolic-endocrine derangements. Lactate <2. UA essentially negative for urinary tract infection.

## 2020-02-08 NOTE — ED PROVIDER NOTE - DURATION
week(s)/**ATTENDING ADDENDUM (Dr. Kirill Stinson): progressively worsening over the past 2 weeks or so

## 2020-02-08 NOTE — H&P ADULT - NSHPSOCIALHISTORY_GEN_ALL_CORE
Social History:    Marital Status:  (   )    (   ) Single    (   )    (  )   Occupation:   Lives with: (  ) alone  (  ) children   (  ) spouse   (  ) parents  (  ) other    Substance Use (street drugs): (  ) never used  (  ) other:  Tobacco Usage:  (   ) never smoked   (   ) former smoker   (   ) current smoker  (     ) pack year  (        ) last cigarette date  Alcohol Usage:  Sexual History: Social History:      Occupation: retired  Lives with: (  ) alone  ( x ) children   (  ) spouse   (  ) parents  (  ) other    Substance Use (street drugs): (x  ) never used  (  ) other:  Tobacco Usage:  ( x  ) never smoked   (   ) former smoker   (   ) current smoker  (     ) pack year  (        ) last cigarette date  Alcohol Usage: denies

## 2020-02-08 NOTE — ED PROVIDER NOTE - ASSOCIATED PAIN OR INJURY
**ATTENDING ADDENDUM (Dr. Kirill Stinson): diarrhea, approximately 5 episodes, on Wednesday this previous week.

## 2020-02-08 NOTE — H&P ADULT - NSHPREVIEWOFSYSTEMS_GEN_ALL_CORE
REVIEW OF SYSTEMS:  CONSTITUTIONAL: No weakness. No fevers. No chills. No weight loss. Good appetite.  EYES: No visual changes. No eye pain.  ENT: No hearing difficulty. No vertigo. No dysphagia. No Sinusitis/rhinorrhea.  NECK: No pain. No stiffness/rigidity.  CARDIAC: No chest pain. No palpitations.  RESPIRATORY: No cough. No SOB. No hemoptysis.  GASTROINTESTINAL: No abdominal pain. No nausea. No vomiting. No hematemesis. No diarrhea. No constipation. No melena. No hematochezia.  GENITOURINARY: No dysuria. No frequency. No hesitancy. No hematuria.  NEUROLOGICAL: No numbness. No focal weakness. No incontinence. No headache.  BACK: No back pain.  EXTREMITIES: No lower extremity edema. Full ROM.  SKIN: No rashes. No itching. No other lesions.  PSYCHIATRIC: No depression. No anxiety. No SI/HI.  ALLERGIC: No lip swelling. No hives.  All other review of systems is negative unless indicated above.  [  ] Unable to assess ROS because REVIEW OF SYSTEMS:  CONSTITUTIONAL: ++weakness. No fevers. No chills. No weight loss. +dec appetite.  EYES: No visual changes. No eye pain.  ENT: No hearing difficulty. No vertigo. No dysphagia. No Sinusitis/rhinorrhea.  NECK: No pain. No stiffness/rigidity.  CARDIAC: No chest pain. No palpitations.  RESPIRATORY: +cough. No SOB. No hemoptysis.  GASTROINTESTINAL: No abdominal pain. No nausea. No vomiting. No hematemesis. No diarrhea. No constipation. No melena. No hematochezia.  GENITOURINARY: No dysuria. No frequency. No hesitancy. No hematuria.  NEUROLOGICAL: No numbness. No focal weakness. No incontinence. No headache.  BACK: No back pain.  EXTREMITIES: No lower extremity edema. Full ROM.  SKIN: No rashes. No itching. No other lesions.  PSYCHIATRIC: No depression. No anxiety. No SI/HI.  ALLERGIC: No lip swelling. No hives.  All other review of systems is negative unless indicated above.

## 2020-02-08 NOTE — ED PROVIDER NOTE - BREATH SOUNDS
**ATTENDING ADDENDUM (Dr. Kirill Stinson): POSITIVE BILATERAL BREATH SOUNDS auscultated. POSITIVE coarse rhonchi bilaterally, but NO stridor, drooling, tripoding, or wheezing. POSITIVE air movement (symmetrically equal). NO crackles. POSITIVE bilateral chest wall expansion WITHOUT crepitus, tenderness, or deformity. POSITIVE midline trachea./RHONCHI

## 2020-02-08 NOTE — ED PROVIDER NOTE - NEUROLOGICAL, MLM
Alert and oriented, no focal deficits, no motor or sensory deficits. **ATTENDING ADDENDUM (Dr. Kirill Stinson): [place] coma score = 15 (eyes =4, verbal =5, motor =6). NO posturing. NO focal motor weakness. NO sensory changes. Awake, alert, coherent, interactive, and oriented to person, place, and time.

## 2020-02-08 NOTE — ED ADULT NURSE NOTE - ED STAT RN HANDOFF DETAILS
Report received from covering CARMEL Mace. Pt A&Ox4. No c/o pain. Family at [facility]. Pt coughing intermittently. Droplet isolation maintained. Exp wheezes audible. color pink. skin W&D. Fall risk precautions maintained,. Denies fever or chills

## 2020-02-08 NOTE — ED PROVIDER NOTE - EYES, MLM
Clear bilaterally, pupils equal, round and reactive to light. **ATTENDING ADDENDUM (Dr. Kirill Stinson): POSITIVE glasses. Extraocular muscle movements intact. Clear corneas bilaterally, pupils equal and round. NO nystagmus.

## 2020-02-08 NOTE — ED PROVIDER NOTE - CROS ED NEURO POS
DIFFICULTY WALKING / IMBALANCE/**ATTENDING ADDENDUM (Dr. Kirill Stinson): NO reported syncope or near-syncope. POSITIVE falls (two in the past two weeks).

## 2020-02-08 NOTE — H&P ADULT - PROBLEM SELECTOR PLAN 3
-frequent falls over last 2 wks, family having difficulty managing at home  -trauma imaging [place] without acute findings  -fall prec, asp prec  -PT eval  -vitamin d supplementation  -ua not suggestive of infection can c/w home macrobid ppx

## 2020-02-08 NOTE — H&P ADULT - PROBLEM SELECTOR PLAN 1
-progressive weakness/falls, cough, chest congestion. +sick contacts. Exam fairly benign, labs with mild tolu otherwise unrevealing. +flu AH3 on rvp. CXR reviewed no infiltrate  -admit to medicine  -sx onset appears to be few days ago; though likely outside of first 72 hrs, given age will start short course of dose reduced tamiflu given decreased CrCl of 25.   -robitussin prn, tessalon prn  -encourage hydration, s/p IVF in ER; temp hold diuretic.

## 2020-02-08 NOTE — ED ADULT NURSE REASSESSMENT NOTE - NS ED NURSE REASSESS COMMENT FT1
Report received from CARMEL Tello, patient A&Ox3, breathing spontaneous and unlabored. ED tech asked to take patient to the bathroom via wheelchair. Patient has family at bedside, wants an update. MD Stinson aware, in room with patient at this time. Awaiting dispo.

## 2020-02-08 NOTE — H&P ADULT - HISTORY OF PRESENT ILLNESS
89 F PMH Breast ca, HTN, HLD, recurrent UTI on daily macrobid ppx, p/w recurrent falls.  Pt states 2 weeks ago, she had a fall but was padded with her winter coat and gloves/boots, so did not sustain any injuries, and was helped up by a passerby.  States fall was mechanical, no prodrome/dizziness/cp, and had been ambulatory afterwards.  About 1 wk ago, pt had a second fall, this time ambulating to bathroom, again lost her balance and fell, no headstrike/loc. Seen in her pcp's office early last week, where she also c/o "chest cold". +cough (dry but feels congested and can't bring mucous up). +dec po intake over last few days. PCP gave her azithromycin and prednisone rx; felt better for 2 days, then sx worsened, weakness worsened. Yesterday, she also had 5 episodes of watery diarrhea, minimal blood in setting of known hemorrhoids. +sick contacts (son/DIL both with uri), no travel.  Denies f/c, n/v/d, dysuria.   Per family as documented in ER notes, pt is getting very weak and needs help getting out of bed now, difficult to take care of at home, afraid of further falls.    VS: 98.3, 62, 134/62, 17, 96% RA.  Labs: no leukocytosis rest cbc unrevealing, elevated scr for age ?tolu vs. ckd CrCl ~25, rest cmp unrevealing, Lactate wnl, UA neg, RVP +Flu AH3. CXR clear lungs. In ER pt received nebs, IVF prior to medicine team involvement.

## 2020-02-09 LAB
ALBUMIN SERPL ELPH-MCNC: 2.9 G/DL — LOW (ref 3.3–5)
ALP SERPL-CCNC: 81 U/L — SIGNIFICANT CHANGE UP (ref 40–120)
ALT FLD-CCNC: 25 U/L — SIGNIFICANT CHANGE UP (ref 10–45)
ANION GAP SERPL CALC-SCNC: 12 MMOL/L — SIGNIFICANT CHANGE UP (ref 5–17)
AST SERPL-CCNC: 24 U/L — SIGNIFICANT CHANGE UP (ref 10–40)
BASOPHILS # BLD AUTO: 0.02 K/UL — SIGNIFICANT CHANGE UP (ref 0–0.2)
BASOPHILS NFR BLD AUTO: 0.4 % — SIGNIFICANT CHANGE UP (ref 0–2)
BILIRUB SERPL-MCNC: 0.5 MG/DL — SIGNIFICANT CHANGE UP (ref 0.2–1.2)
BUN SERPL-MCNC: 31 MG/DL — HIGH (ref 7–23)
CALCIUM SERPL-MCNC: 9.2 MG/DL — SIGNIFICANT CHANGE UP (ref 8.4–10.5)
CHLORIDE SERPL-SCNC: 103 MMOL/L — SIGNIFICANT CHANGE UP (ref 96–108)
CO2 SERPL-SCNC: 26 MMOL/L — SIGNIFICANT CHANGE UP (ref 22–31)
CREAT SERPL-MCNC: 1.15 MG/DL — SIGNIFICANT CHANGE UP (ref 0.5–1.3)
CULTURE RESULTS: SIGNIFICANT CHANGE UP
EOSINOPHIL # BLD AUTO: 0.11 K/UL — SIGNIFICANT CHANGE UP (ref 0–0.5)
EOSINOPHIL NFR BLD AUTO: 2 % — SIGNIFICANT CHANGE UP (ref 0–6)
GLUCOSE SERPL-MCNC: 97 MG/DL — SIGNIFICANT CHANGE UP (ref 70–99)
HCT VFR BLD CALC: 34.7 % — SIGNIFICANT CHANGE UP (ref 34.5–45)
HGB BLD-MCNC: 11.1 G/DL — LOW (ref 11.5–15.5)
IMM GRANULOCYTES NFR BLD AUTO: 0.4 % — SIGNIFICANT CHANGE UP (ref 0–1.5)
LYMPHOCYTES # BLD AUTO: 1.09 K/UL — SIGNIFICANT CHANGE UP (ref 1–3.3)
LYMPHOCYTES # BLD AUTO: 19.9 % — SIGNIFICANT CHANGE UP (ref 13–44)
MAGNESIUM SERPL-MCNC: 1.7 MG/DL — SIGNIFICANT CHANGE UP (ref 1.6–2.6)
MCHC RBC-ENTMCNC: 29.8 PG — SIGNIFICANT CHANGE UP (ref 27–34)
MCHC RBC-ENTMCNC: 32 GM/DL — SIGNIFICANT CHANGE UP (ref 32–36)
MCV RBC AUTO: 93.3 FL — SIGNIFICANT CHANGE UP (ref 80–100)
MONOCYTES # BLD AUTO: 0.58 K/UL — SIGNIFICANT CHANGE UP (ref 0–0.9)
MONOCYTES NFR BLD AUTO: 10.6 % — SIGNIFICANT CHANGE UP (ref 2–14)
NEUTROPHILS # BLD AUTO: 3.65 K/UL — SIGNIFICANT CHANGE UP (ref 1.8–7.4)
NEUTROPHILS NFR BLD AUTO: 66.7 % — SIGNIFICANT CHANGE UP (ref 43–77)
NRBC # BLD: 0 /100 WBCS — SIGNIFICANT CHANGE UP (ref 0–0)
PHOSPHATE SERPL-MCNC: 2 MG/DL — LOW (ref 2.5–4.5)
PLATELET # BLD AUTO: 207 K/UL — SIGNIFICANT CHANGE UP (ref 150–400)
POTASSIUM SERPL-MCNC: 4 MMOL/L — SIGNIFICANT CHANGE UP (ref 3.5–5.3)
POTASSIUM SERPL-SCNC: 4 MMOL/L — SIGNIFICANT CHANGE UP (ref 3.5–5.3)
PROT SERPL-MCNC: 5.4 G/DL — LOW (ref 6–8.3)
RBC # BLD: 3.72 M/UL — LOW (ref 3.8–5.2)
RBC # FLD: 13.6 % — SIGNIFICANT CHANGE UP (ref 10.3–14.5)
SODIUM SERPL-SCNC: 141 MMOL/L — SIGNIFICANT CHANGE UP (ref 135–145)
SPECIMEN SOURCE: SIGNIFICANT CHANGE UP
WBC # BLD: 5.47 K/UL — SIGNIFICANT CHANGE UP (ref 3.8–10.5)
WBC # FLD AUTO: 5.47 K/UL — SIGNIFICANT CHANGE UP (ref 3.8–10.5)

## 2020-02-09 RX ADMIN — Medication 81 MILLIGRAM(S): at 13:11

## 2020-02-09 RX ADMIN — HEPARIN SODIUM 5000 UNIT(S): 5000 INJECTION INTRAVENOUS; SUBCUTANEOUS at 13:11

## 2020-02-09 RX ADMIN — Medication 30 MILLIGRAM(S): at 13:11

## 2020-02-09 RX ADMIN — LATANOPROST 1 DROP(S): 0.05 SOLUTION/ DROPS OPHTHALMIC; TOPICAL at 21:42

## 2020-02-09 RX ADMIN — HEPARIN SODIUM 5000 UNIT(S): 5000 INJECTION INTRAVENOUS; SUBCUTANEOUS at 21:42

## 2020-02-09 RX ADMIN — ATORVASTATIN CALCIUM 40 MILLIGRAM(S): 80 TABLET, FILM COATED ORAL at 21:42

## 2020-02-09 RX ADMIN — Medication 1 DROP(S): at 06:03

## 2020-02-09 RX ADMIN — Medication 1 DROP(S): at 17:06

## 2020-02-09 RX ADMIN — Medication 1 TABLET(S): at 13:11

## 2020-02-09 RX ADMIN — HEPARIN SODIUM 5000 UNIT(S): 5000 INJECTION INTRAVENOUS; SUBCUTANEOUS at 06:03

## 2020-02-09 NOTE — PROGRESS NOTE ADULT - PROBLEM SELECTOR PLAN 3
-frequent falls over last 2 wks, family having difficulty managing at home  -trauma imaging [place] without acute findings  -fall prec, asp prec  -PT eval  -vitamin d supplementation  -ua not suggestive of infection can c/w home macrobid ppx

## 2020-02-09 NOTE — PROGRESS NOTE ADULT - SUBJECTIVE AND OBJECTIVE BOX
Patient is a 89y old  Female who presents with a chief complaint of influenza AH3 (2020 21:56)      SUBJECTIVE / OVERNIGHT EVENTS:  Pt seen and examined at bedside.   No overnight event.  Feeling better. cough better  no cp, no sob, no n/v/d.   eating breakfast         Vital Signs Last 24 Hrs  T(C): 37.1 (2020 07:29), Max: 37.1 (2020 07:29)  T(F): 98.7 (2020 07:29), Max: 98.7 (2020 07:29)  HR: 58 (2020 07:29) (58 - 67)  BP: 137/65 (2020 07:29) (129/57 - 157/61)  BP(mean): --  RR: 18 (2020 07:29) (17 - 20)  SpO2: 98% (2020 07:29) (95% - 98%)  I&O's Summary      PHYSICAL EXAM:  GENERAL: NAD, Comfortable  HEAD:  Atraumatic, Normocephalic, top of the head with prior skin graft, no bleeding, no erythema. (seeing dermatology)  EYES: EOMI, PERRLA, conjunctiva and sclera clear  NECK: Supple, No JVD  CHEST/LUNG: mild decrease breath sounds bilaterally; No wheeze   HEART: Regular rate and rhythm; No murmurs, rubs, or gallops  ABDOMEN: Soft, Nontender, Nondistended; Bowel sounds present  Neuro: AAO x 3, no focal deficit, 5/5 b/l extremities  EXTREMITIES:  2+ Peripheral Pulses, No clubbing, cyanosis, or edema  SKIN: No rashes or lesions    LABS:                        11.1   5.47  )-----------( 207      ( 2020 06:39 )             34.7     02-09    141  |  103  |  31<H>  ----------------------------<  97  4.0   |  26  |  1.15    Ca    9.2      2020 06:39  Phos  2.0     02-09  Mg     1.7     02-09    TPro  5.4<L>  /  Alb  2.9<L>  /  TBili  0.5  /  DBili  x   /  AST  24  /  ALT  25  /  AlkPhos  81  -      CAPILLARY BLOOD GLUCOSE            Urinalysis Basic - ( 2020 17:37 )    Color: Light Yellow / Appearance: Clear / S.018 / pH: x  Gluc: x / Ketone: Negative  / Bili: Negative / Urobili: Negative   Blood: x / Protein: 30 mg/dL / Nitrite: Negative   Leuk Esterase: Negative / RBC: 5 /hpf / WBC 3 /HPF   Sq Epi: x / Non Sq Epi: 2 /hpf / Bacteria: Negative        RADIOLOGY & ADDITIONAL TESTS:    Imaging Personally Reviewed:  [x] YES  [ ] NO    Consultant(s) Notes Reviewed:  [x] YES  [ ] NO      MEDICATIONS  (STANDING):  aspirin enteric coated 81 milliGRAM(s) Oral daily  atorvastatin 40 milliGRAM(s) Oral at bedtime  heparin  Injectable 5000 Unit(s) SubCutaneous three times a day  latanoprost 0.005% Ophthalmic Solution 1 Drop(s) Both EYES at bedtime  multivitamin 1 Tablet(s) Oral daily  oseltamivir 30 milliGRAM(s) Oral daily  timolol 0.5% Solution 1 Drop(s) Both EYES two times a day    MEDICATIONS  (PRN):  benzonatate 100 milliGRAM(s) Oral three times a day PRN Cough  guaiFENesin   Syrup  (Sugar-Free) 200 milliGRAM(s) Oral every 6 hours PRN Cough      Care Discussed with Consultants/Other Providers [x] YES  [ ] NO    HEALTH ISSUES - PROBLEM Dx:  Diarrhea, unspecified type: Diarrhea, unspecified type  Prophylactic measure: Prophylactic measure  Hypertension, unspecified type: Hypertension, unspecified type  Hyperlipidemia, unspecified hyperlipidemia type: Hyperlipidemia, unspecified hyperlipidemia type  Debility: Debility  JAC (acute kidney injury): JAC (acute kidney injury)  URI due to influenza A virus: URI due to influenza A virus

## 2020-02-09 NOTE — PROGRESS NOTE ADULT - PROBLEM SELECTOR PLAN 4
-given antecedent abx exposure (recenltly treated for right leg cellulitis),   - will check cdiff/stool cx/op/pcr  -no abd pain, hold off on abd imaging at this time.   -afebrile, no leukocytosis

## 2020-02-09 NOTE — ED ADULT NURSE REASSESSMENT NOTE - NS ED NURSE REASSESS COMMENT FT1
Patient sleeping in bed at this time. Vital signs stable. No signs of distress at this time. Bed locked and in lowest position with side rails up for safety. Awaiting bed assignment at this time.

## 2020-02-09 NOTE — PROGRESS NOTE ADULT - PROBLEM SELECTOR PLAN 1
- progressive weakness/falls, cough, chest congestion. +sick contacts (son and daughter in law also coughing). Exam fairly benign, labs with mild tolu otherwise unrevealing. +flu AH3 on rvp. CXR reviewed no infiltrate  -sx onset appears to be few days ago; though likely outside of first 72 hrs, given age will start short course of dose reduced tamiflu given decreased CrCl of 25.   -robitussin prn, tessalon prn  -encourage hydration, s/p IVF in ER  - hold diuretic for now temporarily

## 2020-02-10 LAB
ANION GAP SERPL CALC-SCNC: 10 MMOL/L — SIGNIFICANT CHANGE UP (ref 5–17)
BUN SERPL-MCNC: 30 MG/DL — HIGH (ref 7–23)
CALCIUM SERPL-MCNC: 9.3 MG/DL — SIGNIFICANT CHANGE UP (ref 8.4–10.5)
CHLORIDE SERPL-SCNC: 103 MMOL/L — SIGNIFICANT CHANGE UP (ref 96–108)
CO2 SERPL-SCNC: 26 MMOL/L — SIGNIFICANT CHANGE UP (ref 22–31)
CREAT SERPL-MCNC: 1.2 MG/DL — SIGNIFICANT CHANGE UP (ref 0.5–1.3)
GLUCOSE SERPL-MCNC: 104 MG/DL — HIGH (ref 70–99)
HCT VFR BLD CALC: 36.5 % — SIGNIFICANT CHANGE UP (ref 34.5–45)
HGB BLD-MCNC: 11.6 G/DL — SIGNIFICANT CHANGE UP (ref 11.5–15.5)
MCHC RBC-ENTMCNC: 29.6 PG — SIGNIFICANT CHANGE UP (ref 27–34)
MCHC RBC-ENTMCNC: 31.8 GM/DL — LOW (ref 32–36)
MCV RBC AUTO: 93.1 FL — SIGNIFICANT CHANGE UP (ref 80–100)
NRBC # BLD: 0 /100 WBCS — SIGNIFICANT CHANGE UP (ref 0–0)
PLATELET # BLD AUTO: 235 K/UL — SIGNIFICANT CHANGE UP (ref 150–400)
POTASSIUM SERPL-MCNC: 3.9 MMOL/L — SIGNIFICANT CHANGE UP (ref 3.5–5.3)
POTASSIUM SERPL-SCNC: 3.9 MMOL/L — SIGNIFICANT CHANGE UP (ref 3.5–5.3)
RBC # BLD: 3.92 M/UL — SIGNIFICANT CHANGE UP (ref 3.8–5.2)
RBC # FLD: 13.7 % — SIGNIFICANT CHANGE UP (ref 10.3–14.5)
SODIUM SERPL-SCNC: 139 MMOL/L — SIGNIFICANT CHANGE UP (ref 135–145)
WBC # BLD: 6.33 K/UL — SIGNIFICANT CHANGE UP (ref 3.8–10.5)
WBC # FLD AUTO: 6.33 K/UL — SIGNIFICANT CHANGE UP (ref 3.8–10.5)

## 2020-02-10 RX ADMIN — Medication 1 DROP(S): at 17:32

## 2020-02-10 RX ADMIN — Medication 1 DROP(S): at 05:11

## 2020-02-10 RX ADMIN — Medication 30 MILLIGRAM(S): at 12:42

## 2020-02-10 RX ADMIN — HEPARIN SODIUM 5000 UNIT(S): 5000 INJECTION INTRAVENOUS; SUBCUTANEOUS at 05:11

## 2020-02-10 RX ADMIN — ATORVASTATIN CALCIUM 40 MILLIGRAM(S): 80 TABLET, FILM COATED ORAL at 21:32

## 2020-02-10 RX ADMIN — HEPARIN SODIUM 5000 UNIT(S): 5000 INJECTION INTRAVENOUS; SUBCUTANEOUS at 21:32

## 2020-02-10 RX ADMIN — Medication 81 MILLIGRAM(S): at 12:42

## 2020-02-10 RX ADMIN — LATANOPROST 1 DROP(S): 0.05 SOLUTION/ DROPS OPHTHALMIC; TOPICAL at 23:14

## 2020-02-10 RX ADMIN — Medication 1 TABLET(S): at 12:42

## 2020-02-10 NOTE — PROGRESS NOTE ADULT - PROBLEM SELECTOR PLAN 4
-recent antecedent abx exposure (recenltly treated for right leg cellulitis) and also received Azithromycin for URI,   - plan to check cdiff but diarrhea has resolved.   - no abd pain, will hold off on abd imaging at this time.   -afebrile, no leukocytosis

## 2020-02-10 NOTE — PROGRESS NOTE ADULT - PROBLEM SELECTOR PLAN 3
- frequent falls over last 2 wks, family having difficulty managing at home  - likely due to infection  - trauma imaging mid [place] without acute findings  -fall prec, asp prec  -PT eval  -vitamin d supplementation  -ua not suggestive of infection can c/w home macrobid ppx

## 2020-02-10 NOTE — PHYSICAL THERAPY INITIAL EVALUATION ADULT - PERTINENT HX OF CURRENT PROBLEM, REHAB EVAL
as per chart review: Breast ca, HTN, HLD, recurrent UTI on daily macrobid ppx, p/w recurrent falls, weakness, episodic diarrhea, f/w Flu AH3

## 2020-02-10 NOTE — PROGRESS NOTE ADULT - SUBJECTIVE AND OBJECTIVE BOX
Patient is a 89y old  Female who presents with a chief complaint of influenza AH3 (09 Feb 2020 09:24)      SUBJECTIVE / OVERNIGHT EVENTS:  No overnight events.  Pt feels well. out of bed in chair  cough improving  Denied cp, sob, n/v/d.  no abdominal pain. No HA/dizziness.   the daughter at bedside.      Vital Signs Last 24 Hrs  T(C): 36.3 (10 Feb 2020 14:58), Max: 36.6 (10 Feb 2020 05:08)  T(F): 97.4 (10 Feb 2020 14:58), Max: 97.8 (10 Feb 2020 05:08)  HR: 52 (10 Feb 2020 14:58) (52 - 74)  BP: 119/62 (10 Feb 2020 14:58) (119/62 - 161/63)  BP(mean): --  RR: 17 (10 Feb 2020 14:58) (17 - 18)  SpO2: 94% (10 Feb 2020 14:58) (93% - 95%)  I&O's Summary    09 Feb 2020 07:01  -  10 Feb 2020 07:00  --------------------------------------------------------  IN: 580 mL / OUT: 200 mL / NET: 380 mL    10 Feb 2020 07:01  -  10 Feb 2020 19:47  --------------------------------------------------------  IN: 360 mL / OUT: 400 mL / NET: -40 mL        PHYSICAL EXAM:  GENERAL: NAD, Comfortable  HEAD:  Atraumatic, Normocephalic, top of the head with prior skin graft, no bleeding, no erythema. (seeing dermatology)  EYES: EOMI, PERRLA, conjunctiva and sclera clear  NECK: Supple, No JVD  CHEST/LUNG: mild decrease breath sounds bilaterally; No wheeze   HEART: Regular rate and rhythm; No murmurs, rubs, or gallops  ABDOMEN: Soft, Nontender, Nondistended; Bowel sounds present  Neuro: AAO x 3, no focal deficit, 5/5 b/l extremities  EXTREMITIES:  2+ Peripheral Pulses, No clubbing, cyanosis, or edema  SKIN: No rashes or lesions      LABS:                        11.6   6.33  )-----------( 235      ( 10 Feb 2020 07:26 )             36.5     02-10    139  |  103  |  30<H>  ----------------------------<  104<H>  3.9   |  26  |  1.20    Ca    9.3      10 Feb 2020 07:26  Phos  2.0     02-09  Mg     1.7     02-09    TPro  5.4<L>  /  Alb  2.9<L>  /  TBili  0.5  /  DBili  x   /  AST  24  /  ALT  25  /  AlkPhos  81  02-09      CAPILLARY BLOOD GLUCOSE                RADIOLOGY & ADDITIONAL TESTS:    Imaging Personally Reviewed:  [x] YES  [ ] NO    Consultant(s) Notes Reviewed:  [x] YES  [ ] NO      MEDICATIONS  (STANDING):  aspirin enteric coated 81 milliGRAM(s) Oral daily  atorvastatin 40 milliGRAM(s) Oral at bedtime  heparin  Injectable 5000 Unit(s) SubCutaneous three times a day  latanoprost 0.005% Ophthalmic Solution 1 Drop(s) Both EYES at bedtime  multivitamin 1 Tablet(s) Oral daily  oseltamivir 30 milliGRAM(s) Oral daily  timolol 0.5% Solution 1 Drop(s) Both EYES two times a day    MEDICATIONS  (PRN):  benzonatate 100 milliGRAM(s) Oral three times a day PRN Cough  guaiFENesin   Syrup  (Sugar-Free) 200 milliGRAM(s) Oral every 6 hours PRN Cough      Care Discussed with Consultants/Other Providers [x] YES  [ ] NO    HEALTH ISSUES - PROBLEM Dx:  Diarrhea, unspecified type: Diarrhea, unspecified type  Prophylactic measure: Prophylactic measure  Hypertension, unspecified type: Hypertension, unspecified type  Hyperlipidemia, unspecified hyperlipidemia type: Hyperlipidemia, unspecified hyperlipidemia type  Debility: Debility  JAC (acute kidney injury): JAC (acute kidney injury)  URI due to influenza A virus: URI due to influenza A virus

## 2020-02-10 NOTE — PROGRESS NOTE ADULT - PROBLEM SELECTOR PLAN 2
-in setting of flu and dec po suspect prerenal  -Cr better  - will restart BP meds soon tomorrow based on BP readings  -s/p IVF in ER  -encourage po intake  -renally dose meds  -avoid nephrotoxins

## 2020-02-10 NOTE — PROGRESS NOTE ADULT - ATTENDING COMMENTS
Physical therapy. Out of bed to chair with assistance.   dc planning to rehab.     - Dr. WILFREDO Arroyo ([facility])  - [phone]

## 2020-02-10 NOTE — PHYSICAL THERAPY INITIAL EVALUATION ADULT - NAME OF DISCHARGE PLANNER
Impression:  1. Unsuccessful ERCP complicated by small bowel perforation 6/6/18 s/p ex-lap with small bowel resection and anastomosis 6/6/18  2. Obstructive jaundice with dilated CBD and 1.4 cm enhancing pancreatic head mass s/p percutaneous draining  3. Gastric adenocarcinoma (diagnosed 6/2017) s/p distal gastrectomy with Billroth II, on chemotherapy  4. Post op ileus  5. Elevated lipase    Plan:  - Monitor biliary drain output; if decreases and bili not downtrending would consider tube check  - OOB as tolerated  - Monitor electrolytes and correct derangements  - IVF as appropriate  - Monitor CBC, CMP, INR  - Continue antibiotics  - Nutrition per Surgery  - Supportive care per primary team note left for Sol RN

## 2020-02-11 LAB
ANION GAP SERPL CALC-SCNC: 12 MMOL/L — SIGNIFICANT CHANGE UP (ref 5–17)
BUN SERPL-MCNC: 29 MG/DL — HIGH (ref 7–23)
CALCIUM SERPL-MCNC: 9.3 MG/DL — SIGNIFICANT CHANGE UP (ref 8.4–10.5)
CHLORIDE SERPL-SCNC: 100 MMOL/L — SIGNIFICANT CHANGE UP (ref 96–108)
CO2 SERPL-SCNC: 25 MMOL/L — SIGNIFICANT CHANGE UP (ref 22–31)
CREAT SERPL-MCNC: 1.18 MG/DL — SIGNIFICANT CHANGE UP (ref 0.5–1.3)
GLUCOSE SERPL-MCNC: 96 MG/DL — SIGNIFICANT CHANGE UP (ref 70–99)
POTASSIUM SERPL-MCNC: 3.9 MMOL/L — SIGNIFICANT CHANGE UP (ref 3.5–5.3)
POTASSIUM SERPL-SCNC: 3.9 MMOL/L — SIGNIFICANT CHANGE UP (ref 3.5–5.3)
SODIUM SERPL-SCNC: 137 MMOL/L — SIGNIFICANT CHANGE UP (ref 135–145)

## 2020-02-11 RX ADMIN — ATORVASTATIN CALCIUM 40 MILLIGRAM(S): 80 TABLET, FILM COATED ORAL at 22:29

## 2020-02-11 RX ADMIN — LATANOPROST 1 DROP(S): 0.05 SOLUTION/ DROPS OPHTHALMIC; TOPICAL at 22:29

## 2020-02-11 RX ADMIN — Medication 30 MILLIGRAM(S): at 12:29

## 2020-02-11 RX ADMIN — HEPARIN SODIUM 5000 UNIT(S): 5000 INJECTION INTRAVENOUS; SUBCUTANEOUS at 22:29

## 2020-02-11 RX ADMIN — Medication 1 TABLET(S): at 12:29

## 2020-02-11 RX ADMIN — Medication 1 DROP(S): at 18:04

## 2020-02-11 RX ADMIN — Medication 1 DROP(S): at 05:19

## 2020-02-11 RX ADMIN — Medication 81 MILLIGRAM(S): at 12:29

## 2020-02-11 RX ADMIN — HEPARIN SODIUM 5000 UNIT(S): 5000 INJECTION INTRAVENOUS; SUBCUTANEOUS at 05:19

## 2020-02-11 NOTE — PROGRESS NOTE ADULT - ATTENDING COMMENTS
Physical therapy. Out of bed to chair with assistance.   dc planning to rehab.     Rajesh Candelario will be covering for me starting 2/12/20. He can be reached at  if needed.     - Dr. WILFREDO Arroyo ([facility])  - [phone]

## 2020-02-11 NOTE — PROGRESS NOTE ADULT - SUBJECTIVE AND OBJECTIVE BOX
Patient is a 89y old  Female who presents with a chief complaint of influenza AH3 (10 Feb 2020 19:46)      SUBJECTIVE / OVERNIGHT EVENTS:  Pt sitting up out of bed.  feels well.  no chest pain, no shortness of breath.   comfortable. no n/v/d. no abd pain.  no HA/dizziness.   cough better      Vital Signs Last 24 Hrs  T(C): 37.1 (11 Feb 2020 16:32), Max: 37.1 (11 Feb 2020 16:32)  T(F): 98.7 (11 Feb 2020 16:32), Max: 98.7 (11 Feb 2020 16:32)  HR: 61 (11 Feb 2020 16:32) (50 - 61)  BP: 106/59 (11 Feb 2020 16:32) (106/59 - 152/65)  BP(mean): --  RR: 18 (11 Feb 2020 16:32) (17 - 18)  SpO2: 95% (11 Feb 2020 16:32) (94% - 95%)  I&O's Summary    10 Feb 2020 07:01  -  11 Feb 2020 07:00  --------------------------------------------------------  IN: 880 mL / OUT: 900 mL / NET: -20 mL    11 Feb 2020 07:01  -  11 Feb 2020 18:47  --------------------------------------------------------  IN: 600 mL / OUT: 300 mL / NET: 300 mL      PHYSICAL EXAM:  GENERAL: NAD, Comfortable  HEAD:  Atraumatic, Normocephalic, top of the head with prior skin graft, no bleeding, no erythema. (seeing dermatology)  EYES: EOMI, PERRLA, conjunctiva and sclera clear  NECK: Supple, No JVD  CHEST/LUNG: mild decrease breath sounds bilaterally; No wheeze   HEART: Regular rate and rhythm; No murmurs, rubs, or gallops  ABDOMEN: Soft, Nontender, Nondistended; Bowel sounds present  Neuro: AAO x 3, no focal deficit, 5/5 b/l extremities  EXTREMITIES:  2+ Peripheral Pulses, No clubbing, cyanosis, or edema  SKIN: No rashes or lesions    LABS:                        11.6   6.33  )-----------( 235      ( 10 Feb 2020 07:26 )             36.5     02-11    137  |  100  |  29<H>  ----------------------------<  96  3.9   |  25  |  1.18    Ca    9.3      11 Feb 2020 07:05        CAPILLARY BLOOD GLUCOSE                RADIOLOGY & ADDITIONAL TESTS:    Imaging Personally Reviewed:  [x] YES  [ ] NO    Consultant(s) Notes Reviewed:  [x] YES  [ ] NO      MEDICATIONS  (STANDING):  aspirin enteric coated 81 milliGRAM(s) Oral daily  atorvastatin 40 milliGRAM(s) Oral at bedtime  heparin  Injectable 5000 Unit(s) SubCutaneous three times a day  latanoprost 0.005% Ophthalmic Solution 1 Drop(s) Both EYES at bedtime  multivitamin 1 Tablet(s) Oral daily  oseltamivir 30 milliGRAM(s) Oral daily  timolol 0.5% Solution 1 Drop(s) Both EYES two times a day    MEDICATIONS  (PRN):  benzonatate 100 milliGRAM(s) Oral three times a day PRN Cough  guaiFENesin   Syrup  (Sugar-Free) 200 milliGRAM(s) Oral every 6 hours PRN Cough      Care Discussed with Consultants/Other Providers [x] YES  [ ] NO    HEALTH ISSUES - PROBLEM Dx:  Diarrhea, unspecified type: Diarrhea, unspecified type  Prophylactic measure: Prophylactic measure  Hypertension, unspecified type: Hypertension, unspecified type  Hyperlipidemia, unspecified hyperlipidemia type: Hyperlipidemia, unspecified hyperlipidemia type  Debility: Debility  JAC (acute kidney injury): JAC (acute kidney injury)  URI due to influenza A virus: URI due to influenza A virus

## 2020-02-11 NOTE — PROGRESS NOTE ADULT - PROBLEM SELECTOR PLAN 1
- progressive weakness/falls, cough, chest congestion. +sick contacts (son and daughter in law also coughing). Exam fairly benign, labs with mild tolu otherwise unrevealing. +flu AH3 on rvp. CXR reviewed no infiltrate  -sx onset appears to be few days ago; though likely outside of first 72 hrs, given age will start short course of dose reduced tamiflu given decreased CrCl of 25.   -robitussin prn, tessalon prn  -encourage hydration, s/p IVF in ER  - hold diuretic (on HCTZ) for now temporarily

## 2020-02-11 NOTE — PROGRESS NOTE ADULT - PROBLEM SELECTOR PLAN 4
-recent antecedent abx exposure (recenltly treated for right leg cellulitis) and also received Azithromycin for URI,   - no plan to check cdiff since diarrhea has resolved.   - no abd pain, will hold off on abd imaging at this time.   -afebrile, no leukocytosis

## 2020-02-12 RX ADMIN — Medication 81 MILLIGRAM(S): at 15:11

## 2020-02-12 RX ADMIN — HEPARIN SODIUM 5000 UNIT(S): 5000 INJECTION INTRAVENOUS; SUBCUTANEOUS at 21:39

## 2020-02-12 RX ADMIN — HEPARIN SODIUM 5000 UNIT(S): 5000 INJECTION INTRAVENOUS; SUBCUTANEOUS at 15:12

## 2020-02-12 RX ADMIN — Medication 1 DROP(S): at 17:43

## 2020-02-12 RX ADMIN — HEPARIN SODIUM 5000 UNIT(S): 5000 INJECTION INTRAVENOUS; SUBCUTANEOUS at 05:37

## 2020-02-12 RX ADMIN — Medication 30 MILLIGRAM(S): at 15:12

## 2020-02-12 RX ADMIN — Medication 1 DROP(S): at 05:37

## 2020-02-12 RX ADMIN — LATANOPROST 1 DROP(S): 0.05 SOLUTION/ DROPS OPHTHALMIC; TOPICAL at 21:39

## 2020-02-12 RX ADMIN — ATORVASTATIN CALCIUM 40 MILLIGRAM(S): 80 TABLET, FILM COATED ORAL at 21:39

## 2020-02-12 RX ADMIN — Medication 1 TABLET(S): at 15:11

## 2020-02-12 NOTE — PROGRESS NOTE ADULT - SUBJECTIVE AND OBJECTIVE BOX
Pt sitting up out of bed.  feels well.  some phlegm production, yellow  no chest pain, no shortness of breath.   comfortable. no n/v/d. no abd pain.  no HA/dizziness.   cough better      Vital Signs Last 24 Hrs  T(C): 36.4 (12 Feb 2020 09:08), Max: 37.3 (11 Feb 2020 21:25)  T(F): 97.6 (12 Feb 2020 09:08), Max: 99.1 (11 Feb 2020 21:25)  HR: 49 (12 Feb 2020 09:08) (49 - 68)  BP: 105/69 (12 Feb 2020 09:08) (105/63 - 145/71)  BP(mean): --  RR: 18 (12 Feb 2020 09:08) (17 - 18)  SpO2: 94% (12 Feb 2020 09:08) (94% - 96%)    PHYSICAL EXAM:  GENERAL: NAD, Comfortable  HEAD:  Atraumatic, Normocephalic, top of the head with prior skin graft, no bleeding, no erythema. (seeing dermatology)  EYES: EOMI, PERRLA, conjunctiva and sclera clear  NECK: Supple, No JVD  CHEST/LUNG: mild decrease breath sounds bilaterally; No wheeze   HEART: Regular rate and rhythm; No murmurs, rubs, or gallops  ABDOMEN: Soft, Nontender, Nondistended; Bowel sounds present  Neuro: AAO x 3, no focal deficit, 5/5 b/l extremities  EXTREMITIES:  2+ Peripheral Pulses, No clubbing, cyanosis, or edema  SKIN: No rashes or lesions      LABS:    02-11    137  |  100  |  29<H>  ----------------------------<  96  3.9   |  25  |  1.18    Ca    9.3      11 Feb 2020 07:05        CAPILLARY BLOOD GLUCOSE

## 2020-02-12 NOTE — PROGRESS NOTE ADULT - PROBLEM/PLAN-3
Detail Level: Simple
Detail Level: Detailed
DISPLAY PLAN FREE TEXT

## 2020-02-12 NOTE — PROGRESS NOTE ADULT - PROBLEM SELECTOR PLAN 1
- progressive weakness/falls, cough, chest congestion. +sick contacts (son and daughter in law also coughing). Exam fairly benign, labs with mild tolu otherwise unrevealing. +flu AH3 on rvp. CXR reviewed no infiltrate  -last day of dose reduced tamiflu given decreased CrCl of 25 is tonight.  -robitussin prn, tessalon prn  -encourage hydration, s/p IVF in ER  - hold diuretic (on HCTZ) for now temporarily

## 2020-02-13 VITALS
RESPIRATION RATE: 18 BRPM | HEART RATE: 56 BPM | OXYGEN SATURATION: 97 % | TEMPERATURE: 99 F | DIASTOLIC BLOOD PRESSURE: 73 MMHG | SYSTOLIC BLOOD PRESSURE: 124 MMHG

## 2020-02-13 LAB
ANION GAP SERPL CALC-SCNC: 10 MMOL/L — SIGNIFICANT CHANGE UP (ref 5–17)
BUN SERPL-MCNC: 47 MG/DL — HIGH (ref 7–23)
CALCIUM SERPL-MCNC: 9.3 MG/DL — SIGNIFICANT CHANGE UP (ref 8.4–10.5)
CHLORIDE SERPL-SCNC: 99 MMOL/L — SIGNIFICANT CHANGE UP (ref 96–108)
CO2 SERPL-SCNC: 26 MMOL/L — SIGNIFICANT CHANGE UP (ref 22–31)
CREAT SERPL-MCNC: 1.82 MG/DL — HIGH (ref 0.5–1.3)
CULTURE RESULTS: SIGNIFICANT CHANGE UP
CULTURE RESULTS: SIGNIFICANT CHANGE UP
GLUCOSE SERPL-MCNC: 94 MG/DL — SIGNIFICANT CHANGE UP (ref 70–99)
HCT VFR BLD CALC: 36.6 % — SIGNIFICANT CHANGE UP (ref 34.5–45)
HGB BLD-MCNC: 11.4 G/DL — LOW (ref 11.5–15.5)
MCHC RBC-ENTMCNC: 29.3 PG — SIGNIFICANT CHANGE UP (ref 27–34)
MCHC RBC-ENTMCNC: 31.1 GM/DL — LOW (ref 32–36)
MCV RBC AUTO: 94.1 FL — SIGNIFICANT CHANGE UP (ref 80–100)
NRBC # BLD: 0 /100 WBCS — SIGNIFICANT CHANGE UP (ref 0–0)
PLATELET # BLD AUTO: 290 K/UL — SIGNIFICANT CHANGE UP (ref 150–400)
POTASSIUM SERPL-MCNC: 4.3 MMOL/L — SIGNIFICANT CHANGE UP (ref 3.5–5.3)
POTASSIUM SERPL-SCNC: 4.3 MMOL/L — SIGNIFICANT CHANGE UP (ref 3.5–5.3)
RBC # BLD: 3.89 M/UL — SIGNIFICANT CHANGE UP (ref 3.8–5.2)
RBC # FLD: 14 % — SIGNIFICANT CHANGE UP (ref 10.3–14.5)
SODIUM SERPL-SCNC: 135 MMOL/L — SIGNIFICANT CHANGE UP (ref 135–145)
SPECIMEN SOURCE: SIGNIFICANT CHANGE UP
SPECIMEN SOURCE: SIGNIFICANT CHANGE UP
WBC # BLD: 7.19 K/UL — SIGNIFICANT CHANGE UP (ref 3.8–10.5)
WBC # FLD AUTO: 7.19 K/UL — SIGNIFICANT CHANGE UP (ref 3.8–10.5)

## 2020-02-13 PROCEDURE — 84132 ASSAY OF SERUM POTASSIUM: CPT

## 2020-02-13 PROCEDURE — 87040 BLOOD CULTURE FOR BACTERIA: CPT

## 2020-02-13 PROCEDURE — 87798 DETECT AGENT NOS DNA AMP: CPT

## 2020-02-13 PROCEDURE — 83735 ASSAY OF MAGNESIUM: CPT

## 2020-02-13 PROCEDURE — 87086 URINE CULTURE/COLONY COUNT: CPT

## 2020-02-13 PROCEDURE — 71045 X-RAY EXAM CHEST 1 VIEW: CPT

## 2020-02-13 PROCEDURE — 82947 ASSAY GLUCOSE BLOOD QUANT: CPT

## 2020-02-13 PROCEDURE — 87581 M.PNEUMON DNA AMP PROBE: CPT

## 2020-02-13 PROCEDURE — 85027 COMPLETE CBC AUTOMATED: CPT

## 2020-02-13 PROCEDURE — 97530 THERAPEUTIC ACTIVITIES: CPT

## 2020-02-13 PROCEDURE — 82435 ASSAY OF BLOOD CHLORIDE: CPT

## 2020-02-13 PROCEDURE — 97161 PT EVAL LOW COMPLEX 20 MIN: CPT

## 2020-02-13 PROCEDURE — 80048 BASIC METABOLIC PNL TOTAL CA: CPT

## 2020-02-13 PROCEDURE — 85014 HEMATOCRIT: CPT

## 2020-02-13 PROCEDURE — 87486 CHLMYD PNEUM DNA AMP PROBE: CPT

## 2020-02-13 PROCEDURE — 81001 URINALYSIS AUTO W/SCOPE: CPT

## 2020-02-13 PROCEDURE — 93005 ELECTROCARDIOGRAM TRACING: CPT

## 2020-02-13 PROCEDURE — 83605 ASSAY OF LACTIC ACID: CPT

## 2020-02-13 PROCEDURE — 97116 GAIT TRAINING THERAPY: CPT

## 2020-02-13 PROCEDURE — 99285 EMERGENCY DEPT VISIT HI MDM: CPT | Mod: 25

## 2020-02-13 PROCEDURE — 84295 ASSAY OF SERUM SODIUM: CPT

## 2020-02-13 PROCEDURE — 80053 COMPREHEN METABOLIC PANEL: CPT

## 2020-02-13 PROCEDURE — 87633 RESP VIRUS 12-25 TARGETS: CPT

## 2020-02-13 PROCEDURE — 82803 BLOOD GASES ANY COMBINATION: CPT

## 2020-02-13 PROCEDURE — 99238 HOSP IP/OBS DSCHRG MGMT 30/<: CPT

## 2020-02-13 PROCEDURE — 82330 ASSAY OF CALCIUM: CPT

## 2020-02-13 PROCEDURE — 84100 ASSAY OF PHOSPHORUS: CPT

## 2020-02-13 PROCEDURE — 94640 AIRWAY INHALATION TREATMENT: CPT

## 2020-02-13 RX ORDER — NITROFURANTOIN MACROCRYSTAL 50 MG
1 CAPSULE ORAL
Qty: 0 | Refills: 0 | DISCHARGE

## 2020-02-13 RX ORDER — TELMISARTAN 20 MG/1
1 TABLET ORAL
Qty: 0 | Refills: 0 | DISCHARGE

## 2020-02-13 RX ADMIN — Medication 1 DROP(S): at 05:29

## 2020-02-13 RX ADMIN — HEPARIN SODIUM 5000 UNIT(S): 5000 INJECTION INTRAVENOUS; SUBCUTANEOUS at 05:29

## 2020-02-13 RX ADMIN — Medication 1 TABLET(S): at 13:11

## 2020-02-13 RX ADMIN — HEPARIN SODIUM 5000 UNIT(S): 5000 INJECTION INTRAVENOUS; SUBCUTANEOUS at 13:11

## 2020-02-13 RX ADMIN — Medication 81 MILLIGRAM(S): at 13:10

## 2020-02-13 NOTE — DISCHARGE NOTE PROVIDER - HOSPITAL COURSE
89 F PMH Breast ca, HTN, HLD, recurrent UTI on daily macrobid ppx, p/w recurrent falls.  Pt states 2 weeks ago, she had a fall but was padded with her winter coat and gloves/boots, so did not sustain any injuries, and was helped up by a passerby.  States fall was mechanical, no prodrome/dizziness/cp, and had been ambulatory afterwards.    She was treated with Tamiflu. Diuretics, ARB were held. She was seen by PT and recomm sub acute rehab. She is hemodynamically stable to be discharged to rehab today, spoke to Attending.

## 2020-02-13 NOTE — DIETITIAN INITIAL EVALUATION ADULT. - ENERGY NEEDS
HT 60 inches,  pounds, BMI 23.4,  pounds.  Dxd with URI with flu Ah3, s/p diarrhea and falls  Skin intact.  Generally well nourished for 89 year old female.

## 2020-02-13 NOTE — DISCHARGE NOTE PROVIDER - CARE PROVIDER_API CALL
Nathaniel Negrete)  Internal Medicine  [address] [place] [id number]  Phone: [phone]  Fax: [phone]  Follow Up Time:

## 2020-02-13 NOTE — DIETITIAN INITIAL EVALUATION ADULT. - OTHER INFO
Patient seen for routine length of stay assessment.  Patient found sitting in chair.  Pleasant and friendly.  When asked "how do you feel"?  Patient stated "great"!  Patient admits that she had been sick late last year with leg cellulitis.  Also notes weakness and 2 falls PTA.  Started with diarrhea last week but that is now resolved and Patient feeling much better.  Eats well at home, 3 meals daily with no salt or sugar.  Reinforced importance of protein rich foods for muscle and immune function.  Reports to enjoy tuna fish.  Supplements with multivitamin.  May also benefit from vit C.  Highest wt 130 pounds and now at 120 pounds.  Unable to ascertain when she lost weight but could be related to illness in december and diarrhea.  Lives with son and family but generally independent.  Attends [employer] on Tuesday nights and Senior program.

## 2020-02-13 NOTE — DIETITIAN INITIAL EVALUATION ADULT. - ADD RECOMMEND
DASH diet per Patient request, Monitor diet tolerance, po intake, GI tolerance, weight trends, labs, and skin integrity, check Vit D levels.

## 2020-02-13 NOTE — DISCHARGE NOTE NURSING/CASE MANAGEMENT/SOCIAL WORK - PATIENT PORTAL LINK FT
You can access the FollowMyHealth Patient Portal offered by [facility] by registering at the following website: [email or web address]. By joining [facility]’s FollowMyHealth portal, you will also be able to view your health information using other applications (apps) compatible with our system.

## 2020-02-13 NOTE — DIETITIAN INITIAL EVALUATION ADULT. - PERTINENT MEDS FT
MEDICATIONS  (STANDING):  aspirin enteric coated 81 milliGRAM(s) Oral daily  atorvastatin 40 milliGRAM(s) Oral at bedtime  heparin  Injectable 5000 Unit(s) SubCutaneous three times a day  latanoprost 0.005% Ophthalmic Solution 1 Drop(s) Both EYES at bedtime  multivitamin 1 Tablet(s) Oral daily  timolol 0.5% Solution 1 Drop(s) Both EYES two times a day    MEDICATIONS  (PRN):  benzonatate 100 milliGRAM(s) Oral three times a day PRN Cough  guaiFENesin   Syrup  (Sugar-Free) 200 milliGRAM(s) Oral every 6 hours PRN Cough

## 2020-02-13 NOTE — DISCHARGE NOTE NURSING/CASE MANAGEMENT/SOCIAL WORK - NSDCPNINST_GEN_ALL_CORE
NOTIFY YOUR DOCTOR IF: You have any bleeding that does not stop,  any fever (over 100.4 F) or chills, persistent nausea/vomiting, persistent diarrhea, or if your pain is not controlled on your discharge pain medications.

## 2020-02-13 NOTE — DISCHARGE NOTE PROVIDER - NSDCCPCAREPLAN_GEN_ALL_CORE_FT
PRINCIPAL DISCHARGE DIAGNOSIS  Diagnosis: Influenza  Assessment and Plan of Treatment: treated with Tamiflu, resolved      SECONDARY DISCHARGE DIAGNOSES  Diagnosis: Hypertension, unspecified type  Assessment and Plan of Treatment: BP meds were held due to both hypotension and JAC    Diagnosis: JAC (acute kidney injury)  Assessment and Plan of Treatment: diuretics, ARB were held, no nephrotoxic medications, monitor creatinine    Diagnosis: Hypoxia  Assessment and Plan of Treatment: 2/2 flu, now resolved    Diagnosis: Malaise and fatigue  Assessment and Plan of Treatment: seen by PT, will be transferred to rehab

## 2020-02-13 NOTE — DISCHARGE NOTE PROVIDER - NSDCMRMEDTOKEN_GEN_ALL_CORE_FT
aspirin 81 mg oral tablet: 1 tab(s) orally once a day  Lumigan 0.01% ophthalmic solution: 1 drop(s) in each eye once a day (in the evening)  Multiple Vitamins oral tablet: 1 tab(s) orally once a day  rosuvastatin 10 mg oral tablet: 1 tab(s) orally once a day  timolol maleate 0.5% ophthalmic solution: 1 drop(s) in the left eye once a day  Vitamin C 1000 mg oral tablet: 1 tab(s) orally once a day  Vitamin D3 2000 intl units (50 mcg) oral tablet: 1 tab(s) orally once a day

## 2021-06-15 NOTE — ED ADULT TRIAGE NOTE - CADM TRG TX PRIOR TO ARRIVAL
US before, 1 month f/u left leg angio 12/16. on ASA, statin, plavix, smoker (did she quit??) f/u with PCP uti?   see ambulance record

## 2023-03-06 NOTE — ED ADULT NURSE NOTE - ALCOHOL PRE SCREEN (AUDIT - C)
Rate controlled. Has CV with [facility] scheduled.  
Sx much improved. To continue furosemide, metolazone weekly, monitor weights closely. F/U next week with RFP then.    
Statement Selected

## 2023-05-25 NOTE — H&P ADULT - NSICDXPASTMEDICALHX_GEN_ALL_CORE_FT
[de-identified] : Very pleasant 81-year-old female just recently relocated here from [place] to [place] she was diagnosed with right sciatica approximately 1 year ago she has been on multiple courses of physical therapy in [place] and [place].  She is now permanently here on [place] and establishing care based upon 1 year of right sciatica previous orthopedic surgeon evaluations x-rays that show possible L2 and L1 compression fractures I am ordering a lumbar MRI for the next stage of treatment which may be indicative of lumbar epidural injections.\par \par With regard to the focal groin pain on physical exam she is currently seeing my partner Dr. Wiseman met on June 7 in addition to the left knee evaluation we will get a recommend a right hip evaluation. PAST MEDICAL HISTORY:  Hypertension